# Patient Record
(demographics unavailable — no encounter records)

---

## 2018-06-02 NOTE — HISTORY AND PHYSICAL REPORT
History of Present Illness


Date of examination: 18


Date of admission: 


18 16:51





Chief complaint: 





Vaginal bleeding


History of present illness: 





33yo G 4 P 2 0 1 2 here from [facility] via ambulance for evaluation of 

vag bleeding that started 4 days ago. She denies UCs or LOF. She is a [facility] patient. Her prenatal care is co-managed w/[facility] due to h/o child 

with heart defect. Her LMP was 12/15/2017. She has been given two HOLLI 9/15/18 

by [facility] and 18 by [facility] which makes her 25w0d or 23w3d. She is 

unsure which HOLLI is accurate. No prenatal record is available. 





She reports on Wednesday she noticed a small blood clot after intercourse. On 

Thursday she noticed dark red blood upon wiping after using the restroom. On 

Friday she noticed small bright red blood and as of today she reports small dark

, red blood after using the restroom. 





Her prenatal course is complicated by h/o UTI (completed course of antibiotics) 

and Vit D deficiency (on supplementation). 





Past History


Past Medical History: no pertinent history


Past Surgical History: no surgical history


Family/Genetic History: none


Social history: lives with family, full code





- Obstetrical History


Expected Date of Delivery: 09/15/18


Actual Gestation: 25 Week(s) 1 Day(s) 


: 4


Para: 2


Hx # Term Pregnancies: 2


Number of  Pregnancies: 0


Spontaneous Abortions: 1


Induced : 0


Number of Living Children: 2





Medications and Allergies


 Allergies











Allergy/AdvReac Type Severity Reaction Status Date / Time


 


No Known Allergies Allergy   Verified 18 17:12











 Home Medications











 Medication  Instructions  Recorded  Confirmed  Last Taken  Type


 


Cholecalciferol (Vitamin D3) 1 tab PO QWEEK 18 09:00 

History





[Vitamin D3]    1 


 


Prenatal Vit-Fe Fumar-FA [Prenatal 1 tab PO QDAY 18 09:

00 History





Vitamin]    1 


 


Pyridoxine HCl (Vitamin B6) 1 tab PO DAILY 18 09:00 

History





[Vitamin B-6]    1 











Active Meds: 


Active Medications





Acetaminophen (Tylenol)  650 mg PO Q4H PRN


   PRN Reason: Pain MILD(1-3)/Fever >100.5/HA


Multivitamins/Iron/Calcium (Prenatal Vitamin)  1 each PO QDAY SATISH











Review of Systems


All systems: negative





- Vital Signs


Vital signs: 


 Vital Signs











Temp Pulse Resp BP


 


 98.6 F   85   18   108/65 


 


 18 17:17  18 17:17  18 17:17  18 17:17








 











Temp Pulse Resp BP Pulse Ox


 


 98.6 F   85   18   108/65    


 


 18 17:17  18 17:17  18 17:17  18 17:17   














- Physical Exam


Abdomen: Positive: normal appearance, soft.  Negative: tenderness


Uterus: Positive: normal size, normal contour





- Obstetrical


FHR: auscultation normal


FHR comments: 





baseline 150. Variability appropriate for gestational age


Uterine Contraction Monitor Mode: External


Cervical Dilatation: 0 (per RN after US)


Uterine Contraction Pattern: Absent





Results


Result Diagrams: 


 18 18:13





All other labs normal.








Assessment and Plan





- Patient Problems


(1) 25 weeks gestation of pregnancy


Current Visit: Yes   Status: Acute   





(2) Second trimester bleeding


Current Visit: Yes   Status: Acute   


Plan to address problem: 


Dr. Ng consulted and recommended admission for 23-hr Observation, U/S for 

placenta evaluation & fetal presentation, CBC, T&S


Kleihauer-Betke test, if Rh neg & no +antibodies, RhoGam 300 mcg IM


Vaginal Exam after US


Anticipate d/c on 6/3/18

## 2018-06-03 NOTE — EVENT NOTE
Date: 18





S: 31yo  25wks with 2nd trimester vaginal bleeding, likely post-coital 

bleeding.


Chart reviewed.  Agree with midwife (Randee) assessment.  Patient now describes 

dark red blood, no LOF or ctx.





O:


Cervix: closed/high


Blood tinged vaginal discharge on glove





A/P


1. B+ Rhogam not indicated


2. Post-coital spotting. Recommend pelvic rest for remainder of pregnancy.


3. F/U US / [facility].  Patient will notify MD of admission this weekend. 


4. Stable for discharge.

## 2018-06-04 NOTE — ULTRASOUND REPORT
FINAL REPORT



PROCEDURE:  US OB LIMITED



TECHNIQUE:  Real-time limited sonographic examination was

performed for evaluation of fetal size, position, heartbeat,

fluid volume  for each fetus with image documentation (1 or more

fetuses). CPT 96785







HISTORY:  Second Trimester Bleeding 



COMPARISON:  No prior studies are available for comparison.



FINDINGS:  

Maternal cervix is not diagnostically evaluated. 



FETUS



IUP: Single living intrauterine pregnancy .



Fetal Position: Cephalic.



Placental position: Anterior and grade 0, without previa .



Amniotic fluid volume: Amniotic fluid index measures 10.2

centimeters 



Heart rate and rhythm: 150 BPM, Regular .



Fetal anatomic survey: Not performed.



Fetal biometric measurements were not performed. 



IMPRESSION:  

Amniotic fluid index measures 10.2 centimeters. The placenta is

sonographically unremarkable in appearance.